# Patient Record
Sex: FEMALE | Race: WHITE | NOT HISPANIC OR LATINO | Employment: OTHER
[De-identification: names, ages, dates, MRNs, and addresses within clinical notes are randomized per-mention and may not be internally consistent; named-entity substitution may affect disease eponyms.]

---

## 2017-09-07 ENCOUNTER — RECORDS - HEALTHEAST (OUTPATIENT)
Dept: ADMINISTRATIVE | Facility: OTHER | Age: 37
End: 2017-09-07

## 2017-09-07 LAB
BKR LAB AP ABNORMAL BLEEDING: NO
BKR LAB AP BIRTH CONTROL/HORMONES: NORMAL
BKR LAB AP CERVICAL APPEARANCE: NORMAL
BKR LAB AP GYN ADEQUACY: NORMAL
BKR LAB AP GYN INTERPRETATION: NORMAL
BKR LAB AP HPV REFLEX: NORMAL
BKR LAB AP LMP: NORMAL
BKR LAB AP PATIENT STATUS: NORMAL
BKR LAB AP PREVIOUS ABNORMAL: NORMAL
BKR LAB AP PREVIOUS NORMAL: 2014
HIGH RISK?: NO
PATH REPORT.COMMENTS IMP SPEC: NORMAL
RESULT FLAG (HE HISTORICAL CONVERSION): NORMAL

## 2017-09-17 ENCOUNTER — HOSPITAL ENCOUNTER (EMERGENCY)
Facility: CLINIC | Age: 37
Discharge: HOME | End: 2017-09-17
Attending: PHYSICIAN ASSISTANT | Admitting: PHYSICIAN ASSISTANT
Payer: COMMERCIAL

## 2017-09-17 VITALS
TEMPERATURE: 98.2 F | DIASTOLIC BLOOD PRESSURE: 80 MMHG | SYSTOLIC BLOOD PRESSURE: 121 MMHG | OXYGEN SATURATION: 99 % | RESPIRATION RATE: 17 BRPM

## 2017-09-17 DIAGNOSIS — R35.0 URINARY FREQUENCY: ICD-10-CM

## 2017-09-17 DIAGNOSIS — R30.0 BURNING WITH URINATION: ICD-10-CM

## 2017-09-17 DIAGNOSIS — R82.90 BAD ODOR OF URINE: ICD-10-CM

## 2017-09-17 LAB
ALBUMIN UR-MCNC: NEGATIVE MG/DL
APPEARANCE UR: CLEAR
BACTERIA #/AREA URNS HPF: ABNORMAL /HPF
BILIRUB UR QL STRIP: NEGATIVE
COLOR UR AUTO: YELLOW
GLUCOSE UR STRIP-MCNC: NEGATIVE MG/DL
HCG UR QL: NEGATIVE
HGB UR QL STRIP: ABNORMAL
KETONES UR STRIP-MCNC: NEGATIVE MG/DL
LEUKOCYTE ESTERASE UR QL STRIP: NEGATIVE
MUCOUS THREADS #/AREA URNS LPF: PRESENT /LPF
NITRATE UR QL: NEGATIVE
PH UR STRIP: 6 PH (ref 5–7)
RBC #/AREA URNS AUTO: 3 /HPF (ref 0–2)
SOURCE: ABNORMAL
SP GR UR STRIP: 1.01 (ref 1–1.03)
SPECIMEN SOURCE: NORMAL
SQUAMOUS #/AREA URNS AUTO: 3 /HPF (ref 0–1)
UROBILINOGEN UR STRIP-MCNC: NORMAL MG/DL (ref 0–2)
WBC #/AREA URNS AUTO: 1 /HPF (ref 0–2)
WET PREP SPEC: NORMAL

## 2017-09-17 PROCEDURE — 99213 OFFICE O/P EST LOW 20 MIN: CPT | Performed by: PHYSICIAN ASSISTANT

## 2017-09-17 PROCEDURE — 81025 URINE PREGNANCY TEST: CPT | Performed by: PHYSICIAN ASSISTANT

## 2017-09-17 PROCEDURE — 87210 SMEAR WET MOUNT SALINE/INK: CPT | Performed by: PHYSICIAN ASSISTANT

## 2017-09-17 PROCEDURE — 87086 URINE CULTURE/COLONY COUNT: CPT | Performed by: PHYSICIAN ASSISTANT

## 2017-09-17 PROCEDURE — 81001 URINALYSIS AUTO W/SCOPE: CPT | Performed by: PHYSICIAN ASSISTANT

## 2017-09-17 PROCEDURE — 99213 OFFICE O/P EST LOW 20 MIN: CPT

## 2017-09-17 RX ORDER — NITROFURANTOIN 25; 75 MG/1; MG/1
100 CAPSULE ORAL 2 TIMES DAILY
Qty: 14 CAPSULE | Refills: 0 | Status: SHIPPED | OUTPATIENT
Start: 2017-09-17

## 2017-09-17 NOTE — ED PROVIDER NOTES
SUBJECTIVE  Mari Mclaughlin is a 37 year old female who has symptoms of urinary dysuria, urgency and frequency for 3 day(s).  She has also noticed a strong odor.  she denies back pain, nausea, vomiting and fever.     OBJECTIVE     Vital signs noted and reviewed by Bharathi Sood  /80  Temp 98.2  F (36.8  C) (Oral)  Resp 17  SpO2 99%     PEFR:  General appearance: healthy, alert and no distress  Ears: R TM - normal: no effusions, no erythema, and normal landmarks, L TM - normal: no effusions, no erythema, and normal landmarks  Eyes: R normal, L normal  Nose: normal  Oropharynx: normal  Neck: supple and no adenopathy  Lungs: normal and clear to auscultation  Heart: S1, S2 normal, no murmur, click, rub or gallop, regular rate and rhythm  Abdomen: positive findings: tenderness mild RLQ and LLQ           Labs:     Results for orders placed or performed during the hospital encounter of 09/17/17   UA reflex to Microscopic   Result Value Ref Range    Color Urine Yellow     Appearance Urine Clear     Glucose Urine Negative NEG^Negative mg/dL    Bilirubin Urine Negative NEG^Negative    Ketones Urine Negative NEG^Negative mg/dL    Specific Gravity Urine 1.012 1.003 - 1.035    Blood Urine Trace (A) NEG^Negative    pH Urine 6.0 5.0 - 7.0 pH    Protein Albumin Urine Negative NEG^Negative mg/dL    Urobilinogen mg/dL Normal 0.0 - 2.0 mg/dL    Nitrite Urine Negative NEG^Negative    Leukocyte Esterase Urine Negative NEG^Negative    Source Midstream Urine     RBC Urine 3 (H) 0 - 2 /HPF    WBC Urine 1 0 - 2 /HPF    Bacteria Urine Few (A) NEG^Negative /HPF    Squamous Epithelial /HPF Urine 3 (H) 0 - 1 /HPF    Mucous Urine Present (A) NEG^Negative /LPF   HCG qualitative urine   Result Value Ref Range    HCG Qual Urine Negative NEG^Negative   Wet prep   Result Value Ref Range    Specimen Description Vagina     Wet Prep No Trichomonas seen     Wet Prep No clue cells seen     Wet Prep No yeast seen     Wet Prep Few  WBC'S  seen          ASSESSMENT     (R35.0) Urinary frequency  Plan: nitroFURantoin, macrocrystal-monohydrate,         (MACROBID) 100 MG capsule      (R30.0) Burning with urination  Plan: nitroFURantoin, macrocrystal-monohydrate,         (MACROBID) 100 MG capsule      (R82.90) Bad odor of urine  Plan: nitroFURantoin, macrocrystal-monohydrate,         (MACROBID) 100 MG capsule         PLAN  Urinalysis discussed with patient  Treatment currentguidelines - also push fluids, may use Pyridium OTC prn. Follow up with PCP if these symptoms worsen or fail to improve as anticipated.    Rx for Macrobid for 7 days.    We will call with culture results if resistant.        Bharathi Sood  9/17/2017, 3:56 PM       Bharathi Sood PA-C  09/17/17 1720

## 2017-09-17 NOTE — DISCHARGE INSTRUCTIONS

## 2017-09-17 NOTE — ED AVS SNAPSHOT
[facility] Emergency Department    [address] [facility] [address] [address]    Phone:  [phone]    Fax:  [phone]                                       Mari Mclaughlin   MRN: [id number]    Department:  [facility] Emergency Department   Date of Visit:  9/17/2017           After Visit Summary Signature Page     I have received my discharge instructions, and my questions have been answered. I have discussed any challenges I see with this plan with the nurse or doctor.    ..........................................................................................................................................  Patient/Patient Representative Signature      ..........................................................................................................................................  Patient Representative Print Name and Relationship to Patient    ..................................................               ................................................  Date                                            Time    ..........................................................................................................................................  Reviewed by Signature/Title    ...................................................              ..............................................  Date                                                            Time

## 2017-09-17 NOTE — ED AVS SNAPSHOT
" [facility] Emergency Department    [address] [facility] [address]    Phone:  [phone]    Fax:  [phone]                                       Mari Mclaughlin   MRN: [id number]    Department:  [facility] Emergency Department   Date of Visit:  9/17/2017           Patient Information     Date Of Birth          1980        Your diagnoses for this visit were:     Urinary frequency     Burning with urination     Bad odor of urine        You were seen by Bharathi Sood PA-C.        Discharge Instructions         Dysuria     Painful urination (dysuria) is often caused by a problem in the urinary tract.   Dysuria is pain felt during urination. It is often described as a burning. Learn more about this problem and how it can be treated.  What causes dysuria?  Possible causes include:    Infection with a bacteria or virus such as a urinary tract infection (UTI or a sexually transmitted infection (STI)    Sensitivity or allergy to chemicals such as those found in lotions and other products    Prostate or bladder problems    Radiation therapy to the pelvic area  How is dysuria diagnosed?  Your healthcare provider will examine you. He or she will ask about your symptoms and health. After talking with you and doing a physical exam, your healthcare provider may know what is causing your dysuria. He or she will usually request  a sample of your urine. Tests of your urine, or a \"urinalysis,\" are done. A urinalysis may include:    Looking at the urine sample (visual exam)    Checking for substances (chemical exam)    Looking at a small amount under a microscope (microscopic exam)  Some parts of the urinalysis may be done in the provider's office and some in a lab. And, the urine sample may be checked for bacteria and yeast (urine culture). Your healthcare provider will tell you more about these tests if they are needed.  How is dysuria treated?  Treatment depends on the cause. If you have a " bacterial infection, you may need antibiotics. You may be given medicines to make it easier for you to urinate and help relieve pain. Your healthcare provider can tell you more about your treatment options. Untreated, symptoms may get worse.  When to call your healthcare provider  Call the healthcare provider right away if you have any of the following:    Fever of 100.4 F (38 C) or higher     No improvement after three days of treatment    Trouble urinating because of pain    New or increased discharge from the vagina or penis    Rash or joint pain    Increased back or abdominal pain    Enlarged painful lymph nodes (lumps) in the groin   Date Last Reviewed: 1/1/2017 2000-2017 [employer]. [address] [place] [address]. All rights reserved. This information is not intended as a substitute for professional medical care. Always follow your healthcare professional's instructions.          24 Hour Appointment Hotline       To make an appointment at any [facility], call [phone] ([phone]). If you don't have a family doctor or clinic, we will help you find one. [facility] are conveniently located to serve the needs of you and your family.             Review of your medicines      START taking        Dose / Directions Last dose taken    nitroFURantoin (macrocrystal-monohydrate) 100 MG capsule   Commonly known as:  MACROBID   Dose:  100 mg   Quantity:  14 capsule        Take 1 capsule (100 mg) by mouth 2 times daily   Refills:  0          Our records show that you are taking the medicines listed below. If these are incorrect, please call your family doctor or clinic.        Dose / Directions Last dose taken    albuterol 108 (90 BASE) MCG/ACT Inhaler   Commonly known as:  PROAIR HFA/PROVENTIL HFA/VENTOLIN HFA   Dose:  2 puff   Quantity:  1 Inhaler        Inhale 2 puffs into the lungs every 6 hours as needed for shortness of breath / dyspnea   Refills:  0        doxycycline  100 MG tablet   Commonly known as:  VIBRA-TABS   Dose:  100 mg   Quantity:  56 tablet        Take 1 tablet (100 mg) by mouth 2 times daily   Refills:  1        EPINEPHrine 0.3 MG/0.3ML injection 2-pack   Commonly known as:  EPIPEN/ADRENACLICK/or ANY BX GENERIC EQUIV   Dose:  0.3 mg   Quantity:  2 each        Inject 0.3 mLs (0.3 mg) into the muscle once as needed   Refills:  3        ibuprofen 800 MG tablet   Commonly known as:  ADVIL/MOTRIN   Dose:  800 mg   Quantity:  90 tablet        Take 1 tablet (800 mg) by mouth every 8 hours as needed for pain   Refills:  1        Multi-vitamin Tabs tablet   Dose:  1 tablet        Take 1 tablet by mouth daily   Refills:  0        Omega-3 Fat Ac-Cholecalciferol 500-1000 MG-UNIT Caps        Refills:  0        PROBIOTIC PO   Dose:  1 capsule        Take 1 capsule by mouth every evening   Refills:  0        ZANTAC PO   Dose:  150 mg        Take 150 mg by mouth 2 times daily   Refills:  0                Prescriptions were sent or printed at these locations (1 Prescription)                   [facility] [address]   [address] [place] [address]    Telephone:  [phone]   Fax:  [phone]   Hours:                  E-Prescribed (1 of 1)         nitroFURantoin, macrocrystal-monohydrate, (MACROBID) 100 MG capsule                Procedures and tests performed during your visit     HCG qualitative urine    UA reflex to Microscopic    Urine Culture    Wet prep      Orders Needing Specimen Collection     None      Pending Results     Date and Time Order Name Status Description    9/17/2017 1556 Urine Culture In process             Pending Culture Results     Date and Time Order Name Status Description    9/17/2017 1556 Urine Culture In process             Pending Results Instructions     If you had any lab results that were not finalized at the time of your Discharge, you can call the ED Lab Result RN at [phone]. You will be contacted  by this team for any positive Lab results or changes in treatment. The nurses are available 7 days a week from 10A to 6:30P.  You can leave a message 24 hours per day and they will return your call.        Test Results From Your Hospital Stay        9/17/2017  4:46 PM      Component Results     Component Value Ref Range & Units Status    Color Urine Yellow  Final    Appearance Urine Clear  Final    Glucose Urine Negative NEG^Negative mg/dL Final    Bilirubin Urine Negative NEG^Negative Final    Ketones Urine Negative NEG^Negative mg/dL Final    Specific Gravity Urine 1.012 1.003 - 1.035 Final    Blood Urine Trace (A) NEG^Negative Final    pH Urine 6.0 5.0 - 7.0 pH Final    Protein Albumin Urine Negative NEG^Negative mg/dL Final    Urobilinogen mg/dL Normal 0.0 - 2.0 mg/dL Final    Nitrite Urine Negative NEG^Negative Final    Leukocyte Esterase Urine Negative NEG^Negative Final    Source Midstream Urine  Final    RBC Urine 3 (H) 0 - 2 /HPF Final    WBC Urine 1 0 - 2 /HPF Final    Bacteria Urine Few (A) NEG^Negative /HPF Final    Squamous Epithelial /HPF Urine 3 (H) 0 - 1 /HPF Final    Mucous Urine Present (A) NEG^Negative /LPF Final         9/17/2017  4:38 PM         9/17/2017  4:50 PM      Component Results     Component Value Ref Range & Units Status    HCG Qual Urine Negative NEG^Negative Final    This test is for screening purposes.  Results should be interpreted along with   the clinical picture.  Confirmation testing is available if warranted by   ordering XLJ418, HCG Quantitative Pregnancy.           9/17/2017  5:12 PM      Component Results     Component    Specimen Description    Vagina    Wet Prep    No Trichomonas seen    Wet Prep    No clue cells seen    Wet Prep    No yeast seen    Wet Prep    Few  WBC'S seen                  Thank you for choosing [facility]       Thank you for choosing [facility] for your care. Our goal is always to provide you with excellent care. Hearing back from our patients is one way  "we can continue to improve our services. Please take a few minutes to complete the written survey that you may receive in the mail after you visit with us. Thank you!        [facility] Information     [facility] lets you send messages to your doctor, view your test results, renew your prescriptions, schedule appointments and more. To sign up, go to [email or web address] . Click on \"Log in\" on the left side of the screen, which will take you to the Welcome page. Then click on \"Sign up Now\" on the right side of the page.     You will be asked to enter the access code listed below, as well as some personal information. Please follow the directions to create your username and password.     Your access code is: [email or web address]  Expires: 2017  5:26 PM     Your access code will  in 90 days. If you need help or a new code, please call your [facility] clinic or [phone].        Care EveryWhere ID     This is your Care EveryWhere ID. This could be used by other organizations to access your [facility] medical records  [id number]        Equal Access to Services     BENNIE RABAGO : Hadissac Quiros, frantz murphy, domo clemens, kim patino. So [facility] [phone].    ATENCIÓN: Si habla español, tiene a fernandez disposición servicios gratuitos de asistencia lingüística. Osmin al [phone].    We comply with applicable federal civil rights laws and [place] laws. We do not discriminate on the basis of race, color, national origin, age, disability sex, sexual orientation or gender identity.            After Visit Summary       This is your record. Keep this with you and show to your community pharmacist(s) and doctor(s) at your next visit.                  "

## 2017-09-18 LAB
BACTERIA SPEC CULT: NORMAL
Lab: NORMAL
SPECIMEN SOURCE: NORMAL

## 2018-05-10 ENCOUNTER — RECORDS - HEALTHEAST (OUTPATIENT)
Dept: LAB | Facility: CLINIC | Age: 38
End: 2018-05-10

## 2018-05-11 LAB — BACTERIA SPEC CULT: NO GROWTH

## 2019-03-01 VITALS
HEART RATE: 86 BPM | RESPIRATION RATE: 20 BRPM | SYSTOLIC BLOOD PRESSURE: 122 MMHG | OXYGEN SATURATION: 100 % | DIASTOLIC BLOOD PRESSURE: 81 MMHG | TEMPERATURE: 98.1 F

## 2019-03-01 PROCEDURE — 99284 EMERGENCY DEPT VISIT MOD MDM: CPT | Mod: Z6 | Performed by: EMERGENCY MEDICINE

## 2019-03-01 PROCEDURE — 81025 URINE PREGNANCY TEST: CPT | Performed by: EMERGENCY MEDICINE

## 2019-03-01 PROCEDURE — 81015 MICROSCOPIC EXAM OF URINE: CPT | Performed by: EMERGENCY MEDICINE

## 2019-03-01 PROCEDURE — 99283 EMERGENCY DEPT VISIT LOW MDM: CPT | Performed by: EMERGENCY MEDICINE

## 2019-03-01 PROCEDURE — 81003 URINALYSIS AUTO W/O SCOPE: CPT | Performed by: EMERGENCY MEDICINE

## 2019-03-02 ENCOUNTER — HOSPITAL ENCOUNTER (EMERGENCY)
Facility: CLINIC | Age: 39
Discharge: HOME | End: 2019-03-02
Attending: EMERGENCY MEDICINE | Admitting: EMERGENCY MEDICINE
Payer: COMMERCIAL

## 2019-03-02 DIAGNOSIS — R30.0 DYSURIA: ICD-10-CM

## 2019-03-02 LAB
ALBUMIN UR-MCNC: NEGATIVE MG/DL
APPEARANCE UR: CLEAR
BACTERIA #/AREA URNS HPF: ABNORMAL /HPF
BILIRUB UR QL STRIP: NEGATIVE
COLOR UR AUTO: ABNORMAL
GLUCOSE UR STRIP-MCNC: NEGATIVE MG/DL
HCG UR QL: NEGATIVE
HGB UR QL STRIP: ABNORMAL
KETONES UR STRIP-MCNC: NEGATIVE MG/DL
LEUKOCYTE ESTERASE UR QL STRIP: NEGATIVE
NITRATE UR QL: NEGATIVE
PH UR STRIP: 6 PH (ref 5–7)
RBC #/AREA URNS AUTO: 1 /HPF (ref 0–2)
SOURCE: ABNORMAL
SP GR UR STRIP: 1 (ref 1–1.03)
SQUAMOUS #/AREA URNS AUTO: <1 /HPF (ref 0–1)
UROBILINOGEN UR STRIP-MCNC: 0 MG/DL (ref 0–2)
WBC #/AREA URNS AUTO: 1 /HPF (ref 0–5)

## 2019-03-02 RX ORDER — PHENAZOPYRIDINE HYDROCHLORIDE 200 MG/1
200 TABLET, FILM COATED ORAL 3 TIMES DAILY
Qty: 9 TABLET | Refills: 0 | Status: SHIPPED | OUTPATIENT
Start: 2019-03-02 | End: 2019-03-05

## 2019-03-02 NOTE — DISCHARGE INSTRUCTIONS
Return if symptoms worsen or new symptoms develop.  Follow-up with primary care early next week for recheck.  Drink plenty fluids.  Take ibuprofen and Tylenol for pain.  If worsening pain fevers or symptoms present please return for further evaluation and care.

## 2019-03-02 NOTE — ED AVS SNAPSHOT
[facility] Emergency Department  [address] [facility] [address]  Phone:  [phone]  Fax:  [phone]                                    Mari Mclaughlin   MRN: [id number]    Department:  [facility] Emergency Department   Date of Visit:  3/1/2019           After Visit Summary Signature Page    I have received my discharge instructions, and my questions have been answered. I have discussed any challenges I see with this plan with the nurse or doctor.    ..........................................................................................................................................  Patient/Patient Representative Signature      ..........................................................................................................................................  Patient Representative Print Name and Relationship to Patient    ..................................................               ................................................  Date                                   Time    ..........................................................................................................................................  Reviewed by Signature/Title    ...................................................              ..............................................  Date                                               Time          22EPIC Rev 08/18

## 2019-03-03 ASSESSMENT — ENCOUNTER SYMPTOMS
ACTIVITY CHANGE: 0
DYSURIA: 1
NUMBNESS: 0
NAUSEA: 0
BACK PAIN: 0
APPETITE CHANGE: 0
NECK PAIN: 0
SHORTNESS OF BREATH: 0
FEVER: 0
DIFFICULTY URINATING: 0
ABDOMINAL PAIN: 0
CHILLS: 0
VOMITING: 0
FREQUENCY: 1
SORE THROAT: 0
BRUISES/BLEEDS EASILY: 0
WEAKNESS: 0

## 2019-03-03 NOTE — ED PROVIDER NOTES
History     Chief Complaint   Patient presents with     Rule out Urinary Tract Infection     HPI  Mari Mclaughlin is a 38 year old female who presents with the emergency department complaining of frequency any burning intermittently for the past month.  Patient is concerned she has a urinary tract infection.  Pain is in her bladder region and occasionally has frequency and burning.  This is intermittently happened and sometimes is bad and other times is not.  She has not noticed any blood in her urine.  She denies any fevers or chills.  She has not had any other significant abdominal pain or back pain.  She has not had any flank pain.  She denies any focal numbness weakness in extremity.  She currently denies any significant pain.    Allergies:  Allergies   Allergen Reactions     Nkda [No Known Drug Allergies]      Macrobid [Nitrofurantoin] Rash       Problem List:    Patient Active Problem List    Diagnosis Date Noted     Atypical chest pain 01/23/2014     Priority: Medium     Hyperlipidemia 08/16/2013     Priority: Medium     Diagnosis updated by automated process. Provider to review and confirm.       CARDIOVASCULAR SCREENING; LDL GOAL LESS THAN 160 10/31/2010     Priority: Medium     OSMANY 3 01/01/2009     Priority: Medium     10/21/08:Pap--LSIL  11/11/08:Colpo--OSMANY 2-3. Rec LEEP  12/8/08:LEEP--OSMANY 3, clear margins. Repeat pap in 6 months and pap and HPV in 12 months.  8/10/09:pap--NIL. Repeat pap 6 months.  9/21/10:Pap--NIL. Repeat pap 1 year.  9/15/11 pap ASCUS, - HPV. Rpt pap in one year. In HM, ep, prob list, hx updated. Reminder sent.  11/6/12:Pap--NIL. Pap in 1 year.  2/14/14:Pap--NIL, -HPV. History of abnormal Pap smear: YES - OSMANY 2/3 on biopsy - PAP/HPV co-testing at 12, 24 months.  If two negative results repeat co-testing in 3 years, if negative then routine screening. Pap + HPV in 1 year.             Back pain 05/13/2008     Priority: Medium     03/06/2008: Seen by [facility]  Assessment: Right renal calculi, 3 mm unchanged in size and location. Low back pain, right-sided, likely musculoskeletal.       TOBACCO USE DISORDER 05/08/2008     Priority: Medium     Generalized anxiety disorder 04/03/2007     Priority: Medium     Failed Prozac - increased anxiety  Celexa - weight gain  Zoloft - head zaps  Paxil - side effects  9/10 trial Lexapro         Calculus of kidney      Priority: Medium     1/07 Cysto, let ureteroscopy, lithotripsy, retrograde pyelogram and stone extraction with double J stent insertion-Dr Skinenr.  08/13/07  3-mm stone in the right kidney-that is being followed.Her stones are calcium oxalate..  03/06/2008: Seen by [facility]  Assessment: Right renal calculi, 3 mm unchanged in size and location. Low back pain, right-sided, likely musculoskeletal.         BREAST DISORDERS NEC - enlargement/assymetry 11/25/2006     Priority: Medium     Problem list name updated by automated process. Provider to review and confirm       Contraceptive management      Priority: Medium     Problem list name updated by automated process. Provider to review       Health Care Home 08/13/2013     Priority: Low     EMERGENCY CARE PLAN  August 13, 2013: No current Care Coordination follow up planned. Please refer if Care Coordination services are needed.    Presenting Problem Signs and Symptoms Treatment Plan   Questions or concerns   during clinic hours   I will call my clinic directly:  [facility]  [id number] [place] [address] [id number] [phone].    Questions or concerns outside clinic hours   I will call the 24 hour nurse line at   [phone] or [facility].   Need to schedule an appointment   I will call the 24 hour scheduling team at [phone] or my clinic directly at [phone].    Same day treatment     I will call my clinic first, nurse line if after hours, urgent care and express care if needed.   Clinic care coordination  services (regular clinic hours)     I will call a clinic care coordinator directly:     Facundo Mina RN  Sergio Perez, Fri - [phone]  Wed, Thurs - [phone]    Tabitha Gregory, :    [phone]    Or call my clinic at [phone] and ask to speak with care coordination.   Crisis Services: Behavioral or Mental Health  Crisis Connection 24 Hour Phone Line  [phone]    [facility] 24 Hour Crisis Services  [phone]    [facility] [phone]    [facility]   [phone]       Emergency treatment -- Immediately    CAll 911             Past Medical History:    Past Medical History:   Diagnosis Date     Chickenpox      OSMANY 3 12/08     Postpartum depression        Past Surgical History:    Past Surgical History:   Procedure Laterality Date     CYSTOSCOPY,URETEROSCOPY,LITHOTRIPSY  01/29/2007    LEFT flexible ureteroscopy, LEFT laser lithotripsy     HC UROGRAPHY, RETROGRADE W/WO KUB  01/29/2007    LEFT     HERNIA REPAIR      Right     LAPAROSCOPIC TUBAL LIGATION  10/25/2011    Procedure:LAPAROSCOPIC TUBAL LIGATION; Bilateral Laparoscopic Tubal Ligation with filshie clips; Surgeon:SHANE HENDERSON; Location:[place] OR     LEEP TX, CERVICAL  12/8/08    OSMANY 3 *yearly pap indicated*     Stone extraction and double J insertion performed at [facility] by Raffaele Skinner MD  01/29/2007       Family History:    Family History   Problem Relation Age of Onset     C.A.D. Father         CAB 3v, in 40's     Lipids Father      Lipids Mother      Gastrointestinal Disease Mother 59        colitis     Lipids Sister        Social History:  Marital Status:  Single [1]  Social History     Tobacco Use     Smoking status: Current Every Day Smoker     Packs/day: 0.50     Years: 10.00     Pack years: 5.00     Types: Cigarettes     Smokeless tobacco: Never Used   Substance Use Topics     Alcohol use: No     Drug use: No        Medications:      phenazopyridine  (PYRIDIUM) 200 MG tablet   albuterol (PROAIR HFA, PROVENTIL HFA, VENTOLIN HFA) 108 (90 BASE) MCG/ACT inhaler   doxycycline (VIBRA-TABS) 100 MG tablet   EPINEPHrine (EPIPEN) 0.3 MG/0.3ML injection   ibuprofen (ADVIL,MOTRIN) 800 MG tablet   multivitamin, therapeutic with minerals (MULTI-VITAMIN) TABS   nitroFURantoin, macrocrystal-monohydrate, (MACROBID) 100 MG capsule   Omega-3 Fat Ac-Cholecalciferol 500-1000 MG-UNIT CAPS   Probiotic Product (PROBIOTIC PO)   Ranitidine HCl (ZANTAC PO)         Review of Systems   Constitutional: Negative for activity change, appetite change, chills and fever.   HENT: Negative for sore throat.    Respiratory: Negative for shortness of breath.    Cardiovascular: Negative for chest pain.   Gastrointestinal: Negative for abdominal pain, nausea and vomiting.   Genitourinary: Positive for dysuria and frequency. Negative for decreased urine volume, difficulty urinating, vaginal bleeding and vaginal discharge.   Musculoskeletal: Negative for back pain and neck pain.   Skin: Negative for rash.   Neurological: Negative for weakness and numbness.   Hematological: Does not bruise/bleed easily.       Physical Exam   BP: 122/81  Pulse: 86  Temp: 98.1  F (36.7  C)  Resp: 20  SpO2: 100 %      Physical Exam   Constitutional: She appears well-developed and well-nourished. No distress.   HENT:   Head: Normocephalic.   Mouth/Throat: Oropharynx is clear and moist.   Eyes: Conjunctivae are normal.   Neck: Normal range of motion.   Pulmonary/Chest: Effort normal.   Abdominal: Soft. She exhibits no distension. There is no tenderness.   Musculoskeletal: Normal range of motion. She exhibits no edema or tenderness.   Neurological: She is alert. She exhibits normal muscle tone.   Skin: Skin is warm. Capillary refill takes less than 2 seconds. No rash noted. No erythema.   Psychiatric: She has a normal mood and affect.   Nursing note and vitals reviewed.      ED Course        Procedures               Critical  Care time:  none                   Labs Ordered and Resulted from Time of ED Arrival Up to the Time of Departure from the ED   URINE MACROSCOPIC WITH REFLEX TO MICRO - Abnormal; Notable for the following components:       Result Value    Specific Gravity Urine 1.002 (*)     Blood Urine Small (*)     Bacteria Urine Few (*)     All other components within normal limits   HCG QUALITATIVE URINE       Assessments & Plan (with Medical Decision Making) UA was obtained and revealed no evidence of obvious infection at this time.  Pregnancy test was negative.  Patient is afebrile and having minimal symptoms at this time.  I discussed lab work and further assessment but at this time I do not feel warranted.  I am going to give the patient some Pyridium and see if this improves her symptoms.  She should have her urine rechecked if symptoms worsen or new symptoms develop.  She has no flank pain at this time and no significant abdominal pain.  She should return if any fevers worsening urinary symptoms or other symptoms present.     I have reviewed the nursing notes.    I have reviewed the findings, diagnosis, plan and need for follow up with the patient.          Medication List      Started    phenazopyridine 200 MG tablet  Commonly known as:  PYRIDIUM  200 mg, Oral, 3 TIMES DAILY            Final diagnoses:   Dysuria       3/1/2019   [facility] EMERGENCY DEPARTMENT     Kwame Urias MD  03/03/19 6784

## 2019-04-04 ENCOUNTER — RECORDS - HEALTHEAST (OUTPATIENT)
Dept: LAB | Facility: CLINIC | Age: 39
End: 2019-04-04

## 2019-04-05 LAB
CHOLEST SERPL-MCNC: 211 MG/DL
FASTING STATUS PATIENT QL REPORTED: ABNORMAL
HDLC SERPL-MCNC: 54 MG/DL
HPV SOURCE: NORMAL
HUMAN PAPILLOMA VIRUS 16 DNA: NEGATIVE
HUMAN PAPILLOMA VIRUS 18 DNA: NEGATIVE
HUMAN PAPILLOMA VIRUS FINAL DIAGNOSIS: NORMAL
HUMAN PAPILLOMA VIRUS OTHER HR: NEGATIVE
LDLC SERPL CALC-MCNC: 130 MG/DL
SPECIMEN DESCRIPTION: NORMAL
TRIGL SERPL-MCNC: 136 MG/DL

## 2019-04-11 LAB

## 2020-12-27 ENCOUNTER — NURSE TRIAGE (OUTPATIENT)
Dept: NURSING | Facility: CLINIC | Age: 40
End: 2020-12-27

## 2021-02-15 ENCOUNTER — RECORDS - HEALTHEAST (OUTPATIENT)
Dept: LAB | Facility: CLINIC | Age: 41
End: 2021-02-15

## 2021-02-15 LAB
FSH SERPL-ACNC: 5.8 MIU/ML
PROLACTIN SERPL-MCNC: 4.2 NG/ML (ref 0–20)
TSH SERPL DL<=0.005 MIU/L-ACNC: 1.12 UIU/ML (ref 0.3–5)

## 2021-02-16 LAB — BACTERIA SPEC CULT: NO GROWTH

## 2021-05-30 ENCOUNTER — RECORDS - HEALTHEAST (OUTPATIENT)
Dept: ADMINISTRATIVE | Facility: CLINIC | Age: 41
End: 2021-05-30

## 2021-07-02 ENCOUNTER — RECORDS - HEALTHEAST (OUTPATIENT)
Dept: LAB | Facility: CLINIC | Age: 41
End: 2021-07-02

## 2021-07-02 LAB
ALBUMIN SERPL-MCNC: 4.1 G/DL (ref 3.5–5)
ALP SERPL-CCNC: 48 U/L (ref 45–120)
ALT SERPL W P-5'-P-CCNC: 16 U/L (ref 0–45)
ANION GAP SERPL CALCULATED.3IONS-SCNC: 13 MMOL/L (ref 5–18)
AST SERPL W P-5'-P-CCNC: 14 U/L (ref 0–40)
BILIRUB SERPL-MCNC: 0.5 MG/DL (ref 0–1)
BUN SERPL-MCNC: 12 MG/DL (ref 8–22)
CALCIUM SERPL-MCNC: 9.5 MG/DL (ref 8.5–10.5)
CHLORIDE BLD-SCNC: 108 MMOL/L (ref 98–107)
CHOLEST SERPL-MCNC: 213 MG/DL
CO2 SERPL-SCNC: 19 MMOL/L (ref 22–31)
CREAT SERPL-MCNC: 0.7 MG/DL (ref 0.6–1.1)
ERYTHROCYTE [DISTWIDTH] IN BLOOD BY AUTOMATED COUNT: 12.7 % (ref 11–14.5)
FASTING STATUS PATIENT QL REPORTED: YES
GFR SERPL CREATININE-BSD FRML MDRD: >60 ML/MIN/1.73M2
GLUCOSE BLD-MCNC: 84 MG/DL (ref 70–125)
HCT VFR BLD AUTO: 44.8 % (ref 35–47)
HDLC SERPL-MCNC: 51 MG/DL
HGB BLD-MCNC: 15.2 G/DL (ref 12–16)
LDLC SERPL CALC-MCNC: 140 MG/DL
MCH RBC QN AUTO: 31.9 PG (ref 27–34)
MCHC RBC AUTO-ENTMCNC: 33.9 G/DL (ref 32–36)
MCV RBC AUTO: 94 FL (ref 80–100)
PLATELET # BLD AUTO: 211 THOU/UL (ref 140–440)
PMV BLD AUTO: 11.2 FL (ref 8.5–12.5)
POTASSIUM BLD-SCNC: 3.9 MMOL/L (ref 3.5–5)
PROT SERPL-MCNC: 6.6 G/DL (ref 6–8)
RBC # BLD AUTO: 4.77 MILL/UL (ref 3.8–5.4)
SODIUM SERPL-SCNC: 140 MMOL/L (ref 136–145)
TRIGL SERPL-MCNC: 111 MG/DL
TSH SERPL DL<=0.005 MIU/L-ACNC: 0.9 UIU/ML (ref 0.3–5)
VIT B12 SERPL-MCNC: >2000 PG/ML (ref 213–816)
WBC: 7.6 THOU/UL (ref 4–11)

## 2021-07-04 LAB — 25(OH)D3 SERPL-MCNC: 46.9 NG/ML (ref 30–80)

## 2021-07-06 LAB
HPV SOURCE: NORMAL
HUMAN PAPILLOMA VIRUS 16 DNA: NEGATIVE
HUMAN PAPILLOMA VIRUS 18 DNA: NEGATIVE
HUMAN PAPILLOMA VIRUS FINAL DIAGNOSIS: NORMAL
HUMAN PAPILLOMA VIRUS OTHER HR: NEGATIVE
SPECIMEN DESCRIPTION: NORMAL

## 2021-07-08 LAB
BKR LAB AP ABNORMAL BLEEDING: NO
BKR LAB AP BIRTH CONTROL/HORMONES: NORMAL
BKR LAB AP CERVICAL APPEARANCE: NORMAL
BKR LAB AP GYN ADEQUACY: NORMAL
BKR LAB AP GYN INTERPRETATION: NORMAL
BKR LAB AP HPV REFLEX: NORMAL
BKR LAB AP LMP: NORMAL
BKR LAB AP PATIENT STATUS: NO
BKR LAB AP PREVIOUS ABNORMAL: NORMAL
BKR LAB AP PREVIOUS NORMAL: 2019
HIGH RISK?: NO
PATH REPORT.COMMENTS IMP SPEC: NORMAL
RESULT FLAG (HE HISTORICAL CONVERSION): NORMAL

## 2023-04-28 ENCOUNTER — HOSPITAL ENCOUNTER (EMERGENCY)
Facility: CLINIC | Age: 43
Discharge: HOME | End: 2023-04-28
Attending: EMERGENCY MEDICINE | Admitting: EMERGENCY MEDICINE
Payer: COMMERCIAL

## 2023-04-28 VITALS
DIASTOLIC BLOOD PRESSURE: 73 MMHG | SYSTOLIC BLOOD PRESSURE: 149 MMHG | RESPIRATION RATE: 16 BRPM | WEIGHT: 160 LBS | HEART RATE: 83 BPM | TEMPERATURE: 98.4 F | OXYGEN SATURATION: 99 % | BODY MASS INDEX: 30.21 KG/M2 | HEIGHT: 61 IN

## 2023-04-28 DIAGNOSIS — R35.0 URINARY FREQUENCY: ICD-10-CM

## 2023-04-28 LAB
ALBUMIN UR-MCNC: NEGATIVE MG/DL
APPEARANCE UR: CLEAR
BACTERIA #/AREA URNS HPF: ABNORMAL /HPF
BILIRUB UR QL STRIP: NEGATIVE
COLOR UR AUTO: ABNORMAL
GLUCOSE UR STRIP-MCNC: NEGATIVE MG/DL
HCG UR QL: NEGATIVE
HGB UR QL STRIP: ABNORMAL
KETONES UR STRIP-MCNC: NEGATIVE MG/DL
LEUKOCYTE ESTERASE UR QL STRIP: NEGATIVE
NITRATE UR QL: NEGATIVE
PH UR STRIP: 6 [PH] (ref 5–7)
RBC URINE: 1 /HPF
SP GR UR STRIP: 1 (ref 1–1.03)
SQUAMOUS EPITHELIAL: 1 /HPF
UROBILINOGEN UR STRIP-MCNC: NORMAL MG/DL
WBC URINE: <1 /HPF

## 2023-04-28 PROCEDURE — 99284 EMERGENCY DEPT VISIT MOD MDM: CPT | Performed by: EMERGENCY MEDICINE

## 2023-04-28 PROCEDURE — 250N000013 HC RX MED GY IP 250 OP 250 PS 637: Performed by: EMERGENCY MEDICINE

## 2023-04-28 PROCEDURE — 81025 URINE PREGNANCY TEST: CPT | Performed by: EMERGENCY MEDICINE

## 2023-04-28 PROCEDURE — 81001 URINALYSIS AUTO W/SCOPE: CPT | Performed by: EMERGENCY MEDICINE

## 2023-04-28 RX ORDER — CEPHALEXIN 500 MG/1
500 CAPSULE ORAL ONCE
Status: COMPLETED | OUTPATIENT
Start: 2023-04-28 | End: 2023-04-28

## 2023-04-28 RX ORDER — CEPHALEXIN 500 MG/1
500 CAPSULE ORAL 2 TIMES DAILY
Qty: 8 CAPSULE | Refills: 0 | Status: SHIPPED | OUTPATIENT
Start: 2023-04-28 | End: 2023-05-02

## 2023-04-28 RX ORDER — FLUCONAZOLE 100 MG/1
200 TABLET ORAL ONCE
Status: COMPLETED | OUTPATIENT
Start: 2023-04-28 | End: 2023-04-28

## 2023-04-28 RX ORDER — PHENAZOPYRIDINE HYDROCHLORIDE 100 MG/1
100 TABLET, FILM COATED ORAL ONCE
Status: COMPLETED | OUTPATIENT
Start: 2023-04-28 | End: 2023-04-28

## 2023-04-28 RX ORDER — FLUCONAZOLE 200 MG/1
200 TABLET ORAL DAILY
Qty: 1 TABLET | Refills: 0 | Status: SHIPPED | OUTPATIENT
Start: 2023-04-28

## 2023-04-28 RX ADMIN — CEPHALEXIN 500 MG: 500 CAPSULE ORAL at 23:34

## 2023-04-28 RX ADMIN — PHENAZOPYRIDINE 100 MG: 100 TABLET ORAL at 23:19

## 2023-04-28 RX ADMIN — FLUCONAZOLE 200 MG: 100 TABLET ORAL at 23:34

## 2023-04-28 ASSESSMENT — ENCOUNTER SYMPTOMS
HEADACHES: 0
FEVER: 0
NAUSEA: 0
DYSURIA: 0
VOMITING: 0
FREQUENCY: 1

## 2023-04-28 ASSESSMENT — ACTIVITIES OF DAILY LIVING (ADL): ADLS_ACUITY_SCORE: 35

## 2023-04-29 NOTE — DISCHARGE INSTRUCTIONS
You were seen today for urinary symptoms. The urinalysis is not an obvious UTI, but there is enough there that I'm going to treat you based on your symptoms. I also prescribed a anti-yeast medication to take after you finish the antibiotics to prevent a yeast infection.    Please follow up with your doctor on Monday.    If you develop a fever, come back.     Take good care and I hope you feel better soon.

## 2023-04-29 NOTE — ED PROVIDER NOTES
History     Chief Complaint   Patient presents with     Rule out Urinary Tract Infection     HPI  Mari Mclaughlin is a 42 year old female who who presents with concern for urinary tract infection.  The patient has had this in the past.  She says this feels similar.  She has no dysuria but has frequency and urgency.  No back pain.  No fevers.  No vomiting.  No nausea.  No rashes or recent travel. No headache.    Allergies:  Allergies   Allergen Reactions     Nkda [No Known Drug Allergy]      Macrobid [Nitrofurantoin] Rash       Problem List:    Patient Active Problem List    Diagnosis Date Noted     Atypical chest pain 01/23/2014     Priority: Medium     Hyperlipidemia 08/16/2013     Priority: Medium     Diagnosis updated by automated process. Provider to review and confirm.       CARDIOVASCULAR SCREENING; LDL GOAL LESS THAN 160 10/31/2010     Priority: Medium     OSMANY 3 01/01/2009     Priority: Medium     10/21/08:Pap--LSIL  11/11/08:Colpo--OSMANY 2-3. Rec LEEP  12/8/08:LEEP--OSMANY 3, clear margins. Repeat pap in 6 months and pap and HPV in 12 months.  8/10/09:pap--NIL. Repeat pap 6 months.  9/21/10:Pap--NIL. Repeat pap 1 year.  9/15/11 pap ASCUS, - HPV. Rpt pap in one year. In HM, ep, prob list, hx updated. Reminder sent.  11/6/12:Pap--NIL. Pap in 1 year.  2/14/14:Pap--NIL, -HPV. History of abnormal Pap smear: YES - OSMANY 2/3 on biopsy - PAP/HPV co-testing at 12, 24 months.  If two negative results repeat co-testing in 3 years, if negative then routine screening. Pap + HPV in 1 year.             Back pain 05/13/2008     Priority: Medium     03/06/2008: Seen by [facility]  Assessment: Right renal calculi, 3 mm unchanged in size and location. Low back pain, right-sided, likely musculoskeletal.       TOBACCO USE DISORDER 05/08/2008     Priority: Medium     Generalized anxiety disorder 04/03/2007     Priority: Medium     Failed Prozac - increased anxiety  Celexa - weight gain  Zoloft - head  zaps  Paxil - side effects  9/10 trial Lexapro         Calculus of kidney      Priority: Medium     1/07 Cysto, let ureteroscopy, lithotripsy, retrograde pyelogram and stone extraction with double J stent insertion-Dr Skinner.  08/13/07  3-mm stone in the right kidney-that is being followed.Her stones are calcium oxalate..  03/06/2008: Seen by Talib [facility]  Assessment: Right renal calculi, 3 mm unchanged in size and location. Low back pain, right-sided, likely musculoskeletal.         BREAST DISORDERS NEC - enlargement/assymetry 11/25/2006     Priority: Medium     Problem list name updated by automated process. Provider to review and confirm       Contraceptive management      Priority: Medium     Problem list name updated by automated process. Provider to review       Health Care Home 08/13/2013     Priority: Low     EMERGENCY CARE PLAN  August 13, 2013: No current Care Coordination follow up planned. Please refer if Care Coordination services are needed.    Presenting Problem Signs and Symptoms Treatment Plan   Questions or concerns   during clinic hours   I will call my clinic directly:  [address] [address]  [phone].    Questions or concerns outside clinic hours   I will call the 24 hour nurse line at   [phone] or [facility].   Need to schedule an appointment   I will call the 24 hour scheduling team at [phone] or my clinic directly at [phone].    Same day treatment     I will call my clinic first, nurse line if after hours, urgent care and express care if needed.   Clinic care coordination services (regular clinic hours)     I will call a clinic care coordinator directly:     Facundo Mina RN  Mon, Tues, Fri - [phone]  Wed, Thurs - [phone]    Tabitha Gregory :    [phone]    Or call my clinic at [phone] and ask to speak with care coordination.   Crisis Services: Behavioral or Mental Health  Crisis  Connection 24 Hour Phone Line  [phone]    [facility] 24 Hour Crisis Services  [phone]    [facility] ([facility]) Network [phone]    [facility]   [phone]       Emergency treatment -- Immediately    CAll 911             Past Medical History:    Past Medical History:   Diagnosis Date     Chickenpox      OSMANY 3 12/08     Postpartum depression        Past Surgical History:    Past Surgical History:   Procedure Laterality Date     CYSTOSCOPY,URETEROSCOPY,LITHOTRIPSY  01/29/2007    LEFT flexible ureteroscopy, LEFT laser lithotripsy     HC UROGRAPHY, RETROGRADE W/WO KUB  01/29/2007    LEFT     HERNIA REPAIR      Right     LAPAROSCOPIC TUBAL LIGATION  10/25/2011    Procedure:LAPAROSCOPIC TUBAL LIGATION; Bilateral Laparoscopic Tubal Ligation with filshie clips; Surgeon:SHANE HENDERSON; Location:[place] OR     LEEP TX, CERVICAL  12/8/08    OSMANY 3 *yearly pap indicated*     Stone extraction and double J insertion performed at [facility] by Raffaele Skinner MD  01/29/2007       Family History:    Family History   Problem Relation Age of Onset     C.A.D. Father         CAB 3v, in 40's     Lipids Father      Lipids Mother      Gastrointestinal Disease Mother 59        colitis     Lipids Sister        Social History:  Marital Status:  Single [1]  Social History     Tobacco Use     Smoking status: Every Day     Packs/day: 0.50     Years: 10.00     Pack years: 5.00     Types: Cigarettes     Smokeless tobacco: Never   Substance Use Topics     Alcohol use: No     Drug use: No        Medications:    cephALEXin (KEFLEX) 500 MG capsule  fluconazole (DIFLUCAN) 200 MG tablet  albuterol (PROAIR HFA, PROVENTIL HFA, VENTOLIN HFA) 108 (90 BASE) MCG/ACT inhaler  doxycycline (VIBRA-TABS) 100 MG tablet  EPINEPHrine (EPIPEN) 0.3 MG/0.3ML injection  ibuprofen (ADVIL,MOTRIN) 800 MG tablet  multivitamin, therapeutic with minerals (MULTI-VITAMIN) TABS  nitroFURantoin,  "macrocrystal-monohydrate, (MACROBID) 100 MG capsule  Omega-3 Fat Ac-Cholecalciferol 500-1000 MG-UNIT CAPS  Probiotic Product (PROBIOTIC PO)  Ranitidine HCl (ZANTAC PO)          Review of Systems   Constitutional: Negative for fever.   Gastrointestinal: Negative for nausea and vomiting.   Genitourinary: Positive for frequency and urgency. Negative for dysuria.   Skin: Negative for rash.   Neurological: Negative for headaches.   All other systems reviewed and are negative.    No rashes or recent travel.  Physical Exam   BP: (!) 149/73  Pulse: 83  Temp: 98.4  F (36.9  C)  Resp: 16  Height: 154.9 cm (5' 1\")  Weight: 72.6 kg (160 lb)  SpO2: 99 %      Physical Exam  Constitutional:       General: She is not in acute distress.     Appearance: She is not toxic-appearing.      Comments: Nontoxic   HENT:      Head: Normocephalic.      Right Ear: External ear normal.      Left Ear: External ear normal.      Nose: No congestion.      Mouth/Throat:      Mouth: Mucous membranes are moist.      Pharynx: No oropharyngeal exudate.   Eyes:      General:         Right eye: No discharge.         Left eye: No discharge.      Extraocular Movements: Extraocular movements intact.   Cardiovascular:      Rate and Rhythm: Normal rate.      Pulses: Normal pulses.   Pulmonary:      Effort: No respiratory distress.   Abdominal:      General: There is no distension.      Tenderness: There is no right CVA tenderness or left CVA tenderness.      Comments: mild suprapubic tenderness.   Musculoskeletal:         General: No swelling or deformity.      Cervical back: No rigidity.   Skin:     Capillary Refill: Capillary refill takes less than 2 seconds.      Coloration: Skin is not jaundiced.      Findings: No bruising.   Neurological:      General: No focal deficit present.      Mental Status: She is alert and oriented to person, place, and time.      Cranial Nerves: No cranial nerve deficit.   Psychiatric:      Comments: Very nice         ED Course "              ED Course as of 04/29/23 0353   Fri Apr 28, 2023 2305 There is some bacteria and some blood in the patient's urine.  There is no leukocyte esterase or nitrites.   2332 patient is feeling well.  I updated her about her results.  She then mentioned that she was previously having similar symptoms and it turned out to be a yeast infection.  There is no yeast in her urine here. However, given symptoms and history, will give diflucan here. Will give keflex rx for 4 days. Will give diflucan to take after finishing keflex. Patient is non-toxic. All questions answered. Feels safe with discharge. Gave ER precautions. Will discharge.      Procedures                Results for orders placed or performed during the hospital encounter of 04/28/23 (from the past 24 hour(s))   UA with Microscopic reflex to Culture    Specimen: Urine, Clean Catch   Result Value Ref Range    Color Urine Straw Colorless, Straw, Light Yellow, Yellow    Appearance Urine Clear Clear    Glucose Urine Negative Negative mg/dL    Bilirubin Urine Negative Negative    Ketones Urine Negative Negative mg/dL    Specific Gravity Urine 1.003 1.003 - 1.035    Blood Urine Moderate (A) Negative    pH Urine 6.0 5.0 - 7.0    Protein Albumin Urine Negative Negative mg/dL    Urobilinogen Urine Normal Normal, 2.0 mg/dL    Nitrite Urine Negative Negative    Leukocyte Esterase Urine Negative Negative    Bacteria Urine Few (A) None Seen /HPF    RBC Urine 1 <=2 /HPF    WBC Urine <1 <=5 /HPF    Squamous Epithelials Urine 1 <=1 /HPF    Narrative    Urine Culture not indicated   HCG qualitative urine (UPT)   Result Value Ref Range    hCG Urine Qualitative Negative Negative       Medications   phenazopyridine (PYRIDIUM) tablet 100 mg (100 mg Oral $Given 4/28/23 2319)   fluconazole (DIFLUCAN) tablet 200 mg (200 mg Oral $Given 4/28/23 2334)   cephALEXin (KEFLEX) capsule 500 mg (500 mg Oral $Given 4/28/23 2334)       Assessments & Plan (with Medical Decision Making)      Query UTI, no e/o pyelo, non-toxic, reassuring exam. Doubt kidney stone given exam and history.     I have reviewed the nursing notes.    I have reviewed the findings, diagnosis, plan and need for follow up with the patient.          Medical Decision Making  The patient's presentation was of moderate complexity (an acute illness with systemic symptoms).    The patient's evaluation involved:  ordering and/or review of 1 test(s) in this encounter (see separate area of note for details)    The patient's management necessitated moderate risk (prescription drug management including medications given in the ED).        Discharge Medication List as of 4/28/2023 11:35 PM      START taking these medications    Details   cephALEXin (KEFLEX) 500 MG capsule Take 1 capsule (500 mg) by mouth 2 times daily for 4 days, Disp-8 capsule, R-0, E-Prescribe      fluconazole (DIFLUCAN) 200 MG tablet Take 1 tablet (200 mg) by mouth daily Take 1 tablet after finishing antibiotics, Disp-1 tablet, R-0, E-Prescribe             Final diagnoses:   Urinary frequency       4/28/2023   [facility] EMERGENCY DEPT     Dariusz Bassett MD  04/29/23 0353

## 2023-04-29 NOTE — ED TRIAGE NOTES
Patient reports uti symptoms for 4 days.     Triage Assessment     Row Name 04/28/23 4705       Triage Assessment (Adult)    Airway WDL WDL       Respiratory WDL    Respiratory WDL WDL       Skin Circulation/Temperature WDL    Skin Circulation/Temperature WDL WDL       Cardiac WDL    Cardiac WDL WDL       Peripheral/Neurovascular WDL    Peripheral Neurovascular WDL WDL       Cognitive/Neuro/Behavioral WDL    Cognitive/Neuro/Behavioral WDL WDL

## 2023-05-24 ENCOUNTER — LAB REQUISITION (OUTPATIENT)
Dept: LAB | Facility: CLINIC | Age: 43
End: 2023-05-24

## 2023-05-24 DIAGNOSIS — E78.2 MIXED HYPERLIPIDEMIA: ICD-10-CM

## 2023-05-24 DIAGNOSIS — L65.9 NONSCARRING HAIR LOSS, UNSPECIFIED: ICD-10-CM

## 2023-05-24 DIAGNOSIS — R53.83 OTHER FATIGUE: ICD-10-CM

## 2023-05-24 LAB
ERYTHROCYTE [DISTWIDTH] IN BLOOD BY AUTOMATED COUNT: 12.4 % (ref 10–15)
HCT VFR BLD AUTO: 46 % (ref 35–47)
HGB BLD-MCNC: 15 G/DL (ref 11.7–15.7)
MCH RBC QN AUTO: 31.4 PG (ref 26.5–33)
MCHC RBC AUTO-ENTMCNC: 32.6 G/DL (ref 31.5–36.5)
MCV RBC AUTO: 96 FL (ref 78–100)
PLATELET # BLD AUTO: 218 10E3/UL (ref 150–450)
RBC # BLD AUTO: 4.77 10E6/UL (ref 3.8–5.2)
WBC # BLD AUTO: 7.4 10E3/UL (ref 4–11)

## 2023-05-24 PROCEDURE — 84443 ASSAY THYROID STIM HORMONE: CPT | Performed by: FAMILY MEDICINE

## 2023-05-24 PROCEDURE — 85027 COMPLETE CBC AUTOMATED: CPT | Performed by: FAMILY MEDICINE

## 2023-05-24 PROCEDURE — 80061 LIPID PANEL: CPT | Performed by: FAMILY MEDICINE

## 2023-05-24 PROCEDURE — 82746 ASSAY OF FOLIC ACID SERUM: CPT | Performed by: FAMILY MEDICINE

## 2023-05-24 PROCEDURE — 83540 ASSAY OF IRON: CPT | Performed by: FAMILY MEDICINE

## 2023-05-24 PROCEDURE — 80053 COMPREHEN METABOLIC PANEL: CPT | Performed by: FAMILY MEDICINE

## 2023-05-24 PROCEDURE — 84439 ASSAY OF FREE THYROXINE: CPT | Performed by: FAMILY MEDICINE

## 2023-05-24 PROCEDURE — 82607 VITAMIN B-12: CPT | Performed by: FAMILY MEDICINE

## 2023-05-25 LAB
ALBUMIN SERPL BCG-MCNC: 4.7 G/DL (ref 3.5–5.2)
ALP SERPL-CCNC: 44 U/L (ref 35–104)
ALT SERPL W P-5'-P-CCNC: 18 U/L (ref 10–35)
ANION GAP SERPL CALCULATED.3IONS-SCNC: 10 MMOL/L (ref 7–15)
AST SERPL W P-5'-P-CCNC: 15 U/L (ref 10–35)
BILIRUB SERPL-MCNC: 0.3 MG/DL
BUN SERPL-MCNC: 8.2 MG/DL (ref 6–20)
CALCIUM SERPL-MCNC: 9.5 MG/DL (ref 8.6–10)
CHLORIDE SERPL-SCNC: 105 MMOL/L (ref 98–107)
CHOLEST SERPL-MCNC: 210 MG/DL
CREAT SERPL-MCNC: 0.78 MG/DL (ref 0.51–0.95)
DEPRECATED HCO3 PLAS-SCNC: 25 MMOL/L (ref 22–29)
FOLATE SERPL-MCNC: 29.5 NG/ML (ref 4.6–34.8)
GFR SERPL CREATININE-BSD FRML MDRD: >90 ML/MIN/1.73M2
GLUCOSE SERPL-MCNC: 95 MG/DL (ref 70–99)
HDLC SERPL-MCNC: 52 MG/DL
IRON SERPL-MCNC: 106 UG/DL (ref 37–145)
LDLC SERPL CALC-MCNC: 133 MG/DL
NONHDLC SERPL-MCNC: 158 MG/DL
POTASSIUM SERPL-SCNC: 4 MMOL/L (ref 3.4–5.3)
PROT SERPL-MCNC: 7 G/DL (ref 6.4–8.3)
SODIUM SERPL-SCNC: 140 MMOL/L (ref 136–145)
T4 FREE SERPL-MCNC: 1.29 NG/DL (ref 0.9–1.7)
TRIGL SERPL-MCNC: 123 MG/DL
TSH SERPL DL<=0.005 MIU/L-ACNC: 1.31 UIU/ML (ref 0.3–4.2)
VIT B12 SERPL-MCNC: 2269 PG/ML (ref 232–1245)

## 2024-05-02 ENCOUNTER — HOSPITAL ENCOUNTER (EMERGENCY)
Facility: CLINIC | Age: 44
Discharge: HOME | End: 2024-05-02
Attending: NURSE PRACTITIONER | Admitting: NURSE PRACTITIONER
Payer: COMMERCIAL

## 2024-05-02 VITALS
TEMPERATURE: 98.2 F | RESPIRATION RATE: 24 BRPM | OXYGEN SATURATION: 100 % | HEART RATE: 83 BPM | SYSTOLIC BLOOD PRESSURE: 156 MMHG | DIASTOLIC BLOOD PRESSURE: 87 MMHG
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
## 2024-05-02 PROCEDURE — 250N000011 HC RX IP 250 OP 636: Performed by: NURSE PRACTITIONER

## 2024-05-02 PROCEDURE — 90715 TDAP VACCINE 7 YRS/> IM: CPT | Performed by: NURSE PRACTITIONER

## 2024-05-02 PROCEDURE — G0463 HOSPITAL OUTPT CLINIC VISIT: HCPCS | Mod: 25 | Performed by: NURSE PRACTITIONER

## 2024-05-02 PROCEDURE — 90471 IMMUNIZATION ADMIN: CPT | Performed by: NURSE PRACTITIONER

## 2024-05-02 PROCEDURE — 99213 OFFICE O/P EST LOW 20 MIN: CPT | Performed by: NURSE PRACTITIONER

## 2024-05-02 RX ADMIN — CLOSTRIDIUM TETANI TOXOID ANTIGEN (FORMALDEHYDE INACTIVATED), CORYNEBACTERIUM DIPHTHERIAE TOXOID ANTIGEN (FORMALDEHYDE INACTIVATED), BORDETELLA PERTUSSIS TOXOID ANTIGEN (GLUTARALDEHYDE INACTIVATED), BORDETELLA PERTUSSIS FILAMENTOUS HEMAGGLUTININ ANTIGEN (FORMALDEHYDE INACTIVATED), BORDETELLA PERTUSSIS PERTACTIN ANTIGEN, AND BORDETELLA PERTUSSIS FIMBRIAE 2/3 ANTIGEN 0.5 ML: 5; 2; 2.5; 5; 3; 5 INJECTION, SUSPENSION INTRAMUSCULAR at 15:16

## 2024-05-02 NOTE — DISCHARGE INSTRUCTIONS
Recommend taking Augmentin twice daily for 7 days for the dog bites, if you develop symptoms of infection including fever, increased redness, increased pain, pus colored drainage these are symptoms of infection you should be evaluated in your primary care clinic or return to the urgent care or ER for further evaluation.

## 2024-05-14 NOTE — ED PROVIDER NOTES
ED Provider Note  [facility]      History     Chief Complaint   Patient presents with    Immunization     Pateint states she was bitten by a puppy at a shelter and her tetanus is overdue, her PCP instructed her to come to  for a tetanus vaccine      HPI  Mari Mclaughlin is a 43 year old female who presents with bite to her hand from her puppy has multiple puncture sites from puppy's teeth.  Patient reports that she did clean this after the incident occurred.  There is some redness surrounding these puncture sites.  Patient reports that she is overdue for a tetanus shot and is requesting this while she is here today.  Denies any fever, sensation change or loss in her hand or arm.            Allergies:  Allergies   Allergen Reactions    Nkda [No Known Drug Allergy]     Macrobid [Nitrofurantoin] Rash       Problem List:    Patient Active Problem List    Diagnosis Date Noted    Atypical chest pain 01/23/2014     Priority: Medium    Hyperlipidemia 08/16/2013     Priority: Medium     Diagnosis updated by automated process. Provider to review and confirm.      CARDIOVASCULAR SCREENING; LDL GOAL LESS THAN 160 10/31/2010     Priority: Medium    OSMANY 3 01/01/2009     Priority: Medium     10/21/08:Pap--LSIL  11/11/08:Colpo--OSMANY 2-3. Rec LEEP  12/8/08:LEEP--OSMANY 3, clear margins. Repeat pap in 6 months and pap and HPV in 12 months.  8/10/09:pap--NIL. Repeat pap 6 months.  9/21/10:Pap--NIL. Repeat pap 1 year.  9/15/11 pap ASCUS, - HPV. Rpt pap in one year. In HM, ep, prob list, hx updated. Reminder sent.  11/6/12:Pap--NIL. Pap in 1 year.  2/14/14:Pap--NIL, -HPV. History of abnormal Pap smear: YES - OSMANY 2/3 on biopsy - PAP/HPV co-testing at 12, 24 months.  If two negative results repeat co-testing in 3 years, if negative then routine screening. Pap + HPV in 1 year.            Back pain 05/13/2008     Priority: Medium     03/06/2008: Seen by [facility]  Assessment: Right renal  calculi, 3 mm unchanged in size and location. Low back pain, right-sided, likely musculoskeletal.      TOBACCO USE DISORDER 05/08/2008     Priority: Medium    Generalized anxiety disorder 04/03/2007     Priority: Medium     Failed Prozac - increased anxiety  Celexa - weight gain  Zoloft - head zaps  Paxil - side effects  9/10 trial Lexapro        Calculus of kidney      Priority: Medium     1/07 Cysto, let ureteroscopy, lithotripsy, retrograde pyelogram and stone extraction with double J stent insertion-Dr Skinner.  08/13/07  3-mm stone in the right kidney-that is being followed.Her stones are calcium oxalate..  03/06/2008: Seen by [facility]  Assessment: Right renal calculi, 3 mm unchanged in size and location. Low back pain, right-sided, likely musculoskeletal.        BREAST DISORDERS NEC - enlargement/assymetry 11/25/2006     Priority: Medium     Problem list name updated by automated process. Provider to review and confirm      Contraceptive management      Priority: Medium     Problem list name updated by automated process. Provider to review      Health Care Home 08/13/2013     Priority: Low     EMERGENCY CARE PLAN  August 13, 2013: No current Care Coordination follow up planned. Please refer if Care Coordination services are needed.    Presenting Problem Signs and Symptoms Treatment Plan   Questions or concerns   during clinic hours   I will call my clinic directly:  [address] [address]  [phone].    Questions or concerns outside clinic hours   I will call the 24 hour nurse line at   [phone] or [facility].   Need to schedule an appointment   I will call the 24 hour scheduling team at [phone] or my clinic directly at [phone].    Same day treatment     I will call my clinic first, nurse line if after hours, urgent care and express care if needed.     Clinic care coordination services (regular clinic hours)     I  will call a clinic care coordinator directly:     Facundo Mina RN  Sergio Perez Fri - [phone]  Wed, Thurs - [phone]    Tabitha Bri, :    [phone]    Or call my clinic at [phone] and ask to speak with care coordination.     Crisis Services: Behavioral or Mental Health  [facility] 24 Hour Phone Line  [phone]    [facility] 24 Hour Crisis Services  [phone]    [facility] (Behavioral Health Providers) Network [phone]    [facility]   [phone]         Emergency treatment -- Immediately    [facility] 911             Past Medical History:    Past Medical History:   Diagnosis Date    Chickenpox     OSMANY 3 12/08    Postpartum depression        Past Surgical History:    Past Surgical History:   Procedure Laterality Date    CYSTOSCOPY,URETEROSCOPY,LITHOTRIPSY  01/29/2007    LEFT flexible ureteroscopy, LEFT laser lithotripsy    HC UROGRAPHY, RETROGRADE W/WO KUB  01/29/2007    LEFT    HERNIA REPAIR      Right    LAPAROSCOPIC TUBAL LIGATION  10/25/2011    Procedure:LAPAROSCOPIC TUBAL LIGATION; Bilateral Laparoscopic Tubal Ligation with filshie clips; Surgeon:SHANE HENDERSON; Location:[place] OR    LEEP TX, CERVICAL  12/8/08    OSMANY 3 *yearly pap indicated*    Stone extraction and double J insertion performed at [facility] by Raffaele Skinner MD  01/29/2007       Family History:    Family History   Problem Relation Age of Onset    C.A.D. Father         CAB 3v, in 40's    Lipids Father     Lipids Mother     Gastrointestinal Disease Mother 59        colitis    Lipids Sister        Social History:  Marital Status:  Single [1]  Social History     Tobacco Use    Smoking status: Every Day     Current packs/day: 0.50     Average packs/day: 0.5 packs/day for 10.0 years (5.0 ttl pk-yrs)     Types: Cigarettes    Smokeless tobacco: Never   Substance Use Topics    Alcohol use: No    Drug use: No        Medications:    albuterol (PROAIR HFA, PROVENTIL HFA, VENTOLIN HFA) 108  (90 BASE) MCG/ACT inhaler  doxycycline (VIBRA-TABS) 100 MG tablet  EPINEPHrine (EPIPEN) 0.3 MG/0.3ML injection  fluconazole (DIFLUCAN) 200 MG tablet  ibuprofen (ADVIL,MOTRIN) 800 MG tablet  multivitamin, therapeutic with minerals (MULTI-VITAMIN) TABS  nitroFURantoin, macrocrystal-monohydrate, (MACROBID) 100 MG capsule  Omega-3 Fat Ac-Cholecalciferol 500-1000 MG-UNIT CAPS  Probiotic Product (PROBIOTIC PO)  Ranitidine HCl (ZANTAC PO)          Review of Systems  A medically appropriate review of systems was performed with pertinent positives and negatives noted in the HPI, and all other systems negative.    Physical Exam   No data found.       Physical Exam  General: alert and in no acute distress on arrival  Head: atraumatic, normocephalic  Lungs:  nonlabored  CV:  extremities warm and perfused, brisk capillary refill all extremities.  Skin: no rashes, no diaphoresis and skin color normal, several puncture wounds from teeth puppy bite, 5 puncture sites to the left hand.  These areas are mildly red no drainage present.  Neuro: Patient awake, alert, speech is fluent  Psychiatric: affect/mood normal, appropriate historian.      ED Course                 Procedures                    No results found for this or any previous visit (from the past 24 hour(s)).    MEDICATIONS GIVEN IN THE EMERGENCY DEPARTMENT:  Medications   Tdap (tetanus-diphtheria-acell pertussis) (ADACEL) injection 0.5 mL (0.5 mLs Intramuscular $Given 5/2/24 6156)                Assessments & Plan (with Medical Decision Making)  43 year old female who presents to the Urgent Care for evaluation of dog bite, initial encounter, the wound was cleaned and examined for signs and symptoms of infection.  Augmentin ordered twice daily for 7 days, encourage patient to finish all of oral antibiotics, return if symptoms of infection began such as increased pain, redness, pus colored drainage.  Tetanus immunization was given in urgent care today.       I have reviewed  the nursing notes.    I have reviewed the findings, diagnosis, plan and need for follow up with the patient.        NEW PRESCRIPTIONS STARTED AT TODAY'S ER VISIT  Discharge Medication List as of 5/2/2024  3:29 PM        START taking these medications    Details   amoxicillin-clavulanate (AUGMENTIN) 875-125 MG tablet Take 1 tablet by mouth 2 times daily for 7 days, Disp-14 tablet, R-0, E-Prescribe             Final diagnoses:   Dog bite, initial encounter       5/2/2024   [facility] EMERGENCY DEPT       Pascale Bernstein, JO CNP  05/13/24 7132

## 2024-12-29 ENCOUNTER — HOSPITAL ENCOUNTER (EMERGENCY)
Facility: CLINIC | Age: 44
Discharge: HOME | End: 2024-12-29
Attending: PHYSICIAN ASSISTANT | Admitting: PHYSICIAN ASSISTANT
Payer: COMMERCIAL

## 2024-12-29 VITALS
RESPIRATION RATE: 16 BRPM | DIASTOLIC BLOOD PRESSURE: 57 MMHG | HEART RATE: 75 BPM | TEMPERATURE: 97 F | OXYGEN SATURATION: 97 % | SYSTOLIC BLOOD PRESSURE: 132 MMHG

## 2024-12-29 DIAGNOSIS — N93.9 ABNORMAL VAGINAL BLEEDING: ICD-10-CM

## 2024-12-29 DIAGNOSIS — R39.9 LOWER URINARY TRACT SYMPTOMS: ICD-10-CM

## 2024-12-29 LAB
ALBUMIN UR-MCNC: NEGATIVE MG/DL
APPEARANCE UR: CLEAR
BILIRUB UR QL STRIP: NEGATIVE
CLUE CELLS: NORMAL
COLOR UR AUTO: ABNORMAL
GLUCOSE UR STRIP-MCNC: NEGATIVE MG/DL
HCG UR QL: NEGATIVE
HGB UR QL STRIP: ABNORMAL
KETONES UR STRIP-MCNC: NEGATIVE MG/DL
LEUKOCYTE ESTERASE UR QL STRIP: NEGATIVE
NITRATE UR QL: NEGATIVE
PH UR STRIP: 7 [PH] (ref 5–7)
RBC URINE: 1 /HPF
SP GR UR STRIP: 1 (ref 1–1.03)
SQUAMOUS EPITHELIAL: 1 /HPF
TRICHOMONAS, WET PREP: NORMAL
UROBILINOGEN UR STRIP-MCNC: NORMAL MG/DL
WBC URINE: <1 /HPF
WBC'S/HIGH POWER FIELD, WET PREP: NORMAL
YEAST, WET PREP: NORMAL

## 2024-12-29 PROCEDURE — 81025 URINE PREGNANCY TEST: CPT | Performed by: PHYSICIAN ASSISTANT

## 2024-12-29 PROCEDURE — 81001 URINALYSIS AUTO W/SCOPE: CPT | Performed by: PHYSICIAN ASSISTANT

## 2024-12-29 PROCEDURE — 87210 SMEAR WET MOUNT SALINE/INK: CPT | Performed by: PHYSICIAN ASSISTANT

## 2024-12-29 PROCEDURE — G0463 HOSPITAL OUTPT CLINIC VISIT: HCPCS | Performed by: PHYSICIAN ASSISTANT

## 2024-12-29 PROCEDURE — 99214 OFFICE O/P EST MOD 30 MIN: CPT | Performed by: PHYSICIAN ASSISTANT

## 2024-12-29 PROCEDURE — 87086 URINE CULTURE/COLONY COUNT: CPT | Performed by: PHYSICIAN ASSISTANT

## 2024-12-29 ASSESSMENT — ENCOUNTER SYMPTOMS
CONSTITUTIONAL NEGATIVE: 1
MUSCULOSKELETAL NEGATIVE: 1
DYSURIA: 1
FREQUENCY: 1

## 2024-12-29 ASSESSMENT — ACTIVITIES OF DAILY LIVING (ADL)
ADLS_ACUITY_SCORE: 41
ADLS_ACUITY_SCORE: 41

## 2024-12-29 NOTE — ED PROVIDER NOTES
History   No chief complaint on file.    HPI  Mari Mclaughlin is a 44 year old female who presents to Urgent Care with complaints of possible UTI.  Patient specifically complains of dysuria and increased urinary frequency.  She also reports intermittent external vaginal burning and vaginal discharge.  She has intermittent vaginal bleeding and states she gets her period about once every 3 weeks.  Patient states she has been dealing with chronic bacterial vaginosis and abnormal vaginal bleeding for years.  She is currently following with a holistic medicine group and being treated for hormonal imbalance.  Patient states she has not followed with a gynecologist.  Denies fevers, chills, nausea, vomiting, abdominal pain, back or flank pain, or hematuria.      Allergies:  Allergies   Allergen Reactions    Nkda [No Known Drug Allergy]     Macrobid [Nitrofurantoin] Rash       Problem List:    Patient Active Problem List    Diagnosis Date Noted    Atypical chest pain 01/23/2014     Priority: Medium    Hyperlipidemia 08/16/2013     Priority: Medium     Diagnosis updated by automated process. Provider to review and confirm.      CARDIOVASCULAR SCREENING; LDL GOAL LESS THAN 160 10/31/2010     Priority: Medium    OSMANY 3 01/01/2009     Priority: Medium     10/21/08:Pap--LSIL  11/11/08:Colpo--OSMANY 2-3. Rec LEEP  12/8/08:LEEP--OSMANY 3, clear margins. Repeat pap in 6 months and pap and HPV in 12 months.  8/10/09:pap--NIL. Repeat pap 6 months.  9/21/10:Pap--NIL. Repeat pap 1 year.  9/15/11 pap ASCUS, - HPV. Rpt pap in one year. In HM, ep, prob list, hx updated. Reminder sent.  11/6/12:Pap--NIL. Pap in 1 year.  2/14/14:Pap--NIL, -HPV. History of abnormal Pap smear: YES - OSMANY 2/3 on biopsy - PAP/HPV co-testing at 12, 24 months.  If two negative results repeat co-testing in 3 years, if negative then routine screening. Pap + HPV in 1 year.            Back pain 05/13/2008     Priority: Medium     03/06/2008: Seen by [facility]  Assessment: Right renal calculi, 3 mm unchanged in size and location. Low back pain, right-sided, likely musculoskeletal.      TOBACCO USE DISORDER 05/08/2008     Priority: Medium    Generalized anxiety disorder 04/03/2007     Priority: Medium     Failed Prozac - increased anxiety  Celexa - weight gain  Zoloft - head zaps  Paxil - side effects  9/10 trial Lexapro        Calculus of kidney      Priority: Medium     1/07 Cysto, let ureteroscopy, lithotripsy, retrograde pyelogram and stone extraction with double J stent insertion-Dr Skinner.  08/13/07  3-mm stone in the right kidney-that is being followed.Her stones are calcium oxalate..  03/06/2008: Seen by [facility]  Assessment: Right renal calculi, 3 mm unchanged in size and location. Low back pain, right-sided, likely musculoskeletal.        BREAST DISORDERS NEC - enlargement/assymetry 11/25/2006     Priority: Medium     Problem list name updated by automated process. Provider to review and confirm      Contraceptive management      Priority: Medium     Problem list name updated by automated process. Provider to review          Past Medical History:    Past Medical History:   Diagnosis Date    Chickenpox     OSMANY 3 12/08    Postpartum depression        Past Surgical History:    Past Surgical History:   Procedure Laterality Date    CYSTOSCOPY,URETEROSCOPY,LITHOTRIPSY  01/29/2007    LEFT flexible ureteroscopy, LEFT laser lithotripsy    HC UROGRAPHY, RETROGRADE W/WO KUB  01/29/2007    LEFT    HERNIA REPAIR      Right    LAPAROSCOPIC TUBAL LIGATION  10/25/2011    Procedure:LAPAROSCOPIC TUBAL LIGATION; Bilateral Laparoscopic Tubal Ligation with filshie clips; Surgeon:SHANE HENDERSON; Location:[place] OR    LEEP TX, CERVICAL  12/8/08    OSMANY 3 *yearly pap indicated*    Stone extraction and double J insertion performed at [facility] by Raffaele Skinner MD  01/29/2007       Family History:    Family History    Problem Relation Age of Onset    C.A.D. Father         CAB 3v, in 40's    Lipids Father     Lipids Mother     Gastrointestinal Disease Mother 59        colitis    Lipids Sister        Social History:  Marital Status:  Single [1]  Social History     Tobacco Use    Smoking status: Every Day     Current packs/day: 0.50     Average packs/day: 0.5 packs/day for 10.0 years (5.0 ttl pk-yrs)     Types: Cigarettes    Smokeless tobacco: Never   Substance Use Topics    Alcohol use: No    Drug use: No        Medications:    albuterol (PROAIR HFA, PROVENTIL HFA, VENTOLIN HFA) 108 (90 BASE) MCG/ACT inhaler  doxycycline (VIBRA-TABS) 100 MG tablet  EPINEPHrine (EPIPEN) 0.3 MG/0.3ML injection  fluconazole (DIFLUCAN) 200 MG tablet  ibuprofen (ADVIL,MOTRIN) 800 MG tablet  multivitamin, therapeutic with minerals (MULTI-VITAMIN) TABS  nitroFURantoin, macrocrystal-monohydrate, (MACROBID) 100 MG capsule  Omega-3 Fat Ac-Cholecalciferol 500-1000 MG-UNIT CAPS  Probiotic Product (PROBIOTIC PO)  Ranitidine HCl (ZANTAC PO)          Review of Systems   Constitutional: Negative.    Genitourinary:  Positive for dysuria, frequency, vaginal bleeding and vaginal discharge.   Musculoskeletal: Negative.    All other systems reviewed and are negative.      Physical Exam   BP: 132/57  Pulse: 75  Temp: 97  F (36.1  C)  Resp: 16  SpO2: 97 %      Physical Exam  Constitutional:       General: She is not in acute distress.     Appearance: Normal appearance. She is not ill-appearing, toxic-appearing or diaphoretic.   HENT:      Head: Normocephalic and atraumatic.   Pulmonary:      Effort: Pulmonary effort is normal.   Abdominal:      General: There is no distension.      Palpations: Abdomen is soft.      Tenderness: There is no abdominal tenderness. There is no guarding or rebound.   Genitourinary:     Labia:         Right: No rash, tenderness or lesion.         Left: No rash, tenderness or lesion.       Vagina: No vaginal discharge, erythema, tenderness,  bleeding or lesions.      Cervix: Normal.      Uterus: Not tender.       Adnexa:         Right: No tenderness.          Left: No tenderness.     Skin:     General: Skin is warm and dry.   Neurological:      Mental Status: She is alert.         ED Course        Procedures      Results for orders placed or performed during the hospital encounter of 12/29/24 (from the past 24 hours)   UA with Microscopic reflex to Culture    Specimen: Urine, Clean Catch   Result Value Ref Range    Color Urine Straw Colorless, Straw, Light Yellow, Yellow    Appearance Urine Clear Clear    Glucose Urine Negative Negative mg/dL    Bilirubin Urine Negative Negative    Ketones Urine Negative Negative mg/dL    Specific Gravity Urine 1.003 1.003 - 1.035    Blood Urine Moderate (A) Negative    pH Urine 7.0 5.0 - 7.0    Protein Albumin Urine Negative Negative mg/dL    Urobilinogen Urine Normal Normal, 2.0 mg/dL    Nitrite Urine Negative Negative    Leukocyte Esterase Urine Negative Negative    RBC Urine 1 <=2 /HPF    WBC Urine <1 <=5 /HPF    Squamous Epithelials Urine 1 <=1 /HPF    Narrative    Urine Culture not indicated   HCG qualitative urine (UPT)   Result Value Ref Range    hCG Urine Qualitative Negative Negative   Wet prep    Specimen: Vagina; Swab   Result Value Ref Range    Trichomonas Absent Absent    Yeast Absent Absent    Clue Cells Absent Absent    WBCs/high power field None None       Medications - No data to display    Assessments & Plan (with Medical Decision Making)     Pt is a 44 year old female who presents to Urgent Care with complaints of possible UTI.  Patient specifically complains of dysuria and increased urinary frequency.  She also reports intermittent external vaginal burning and vaginal discharge.  She has intermittent vaginal bleeding and states she gets her period about once every 3 weeks.  Patient states she has been dealing with chronic bacterial vaginosis and abnormal vaginal bleeding for years.  She is currently  following with a holistic medicine group and being treated for hormonal imbalance.      Pt is afebrile on arrival.  Exam as above.  Urinalysis was negative for infection urine was sent for culture.  Wet prep was negative for yeast, clue cells, or trichomonas.  Discussed results with patient.  Encouraged symptomatic treatments at home.  Recommended follow-up with gynecology given her abnormal vaginal bleeding for years.  Referral for this was placed.  Return precautions were reviewed.  Hand-outs were provided.    Patient was instructed to follow-up with PCP for continued care and management.  She is to return to the ED for persistent and/or worsening symptoms.  Patient expressed understanding of the diagnosis and plan and was discharged home in good condition.    I have reviewed the nursing notes.    I have reviewed the findings, diagnosis, plan and need for follow up with the patient.      Discharge Medication List as of 12/29/2024  3:05 PM          Final diagnoses:   Lower urinary tract symptoms   Abnormal vaginal bleeding       12/29/2024   [facility] EMERGENCY DEPT      Disclaimer:  This note consists of symbols derived from keyboarding, dictation and/or voice recognition software.  As a result, there may be errors in the script that have gone undetected.  Please consider this when interpreting information found in this chart.     Corie Truong PA-C  12/29/24 5539

## 2024-12-30 LAB — BACTERIA UR CULT: NORMAL

## 2025-04-12 ENCOUNTER — APPOINTMENT (OUTPATIENT)
Dept: GENERAL RADIOLOGY | Facility: CLINIC | Age: 45
End: 2025-04-12
Attending: NURSE PRACTITIONER
Payer: COMMERCIAL

## 2025-04-12 ENCOUNTER — HOSPITAL ENCOUNTER (EMERGENCY)
Facility: CLINIC | Age: 45
Discharge: HOME | End: 2025-04-12
Attending: NURSE PRACTITIONER | Admitting: NURSE PRACTITIONER
Payer: COMMERCIAL

## 2025-04-12 VITALS
SYSTOLIC BLOOD PRESSURE: 137 MMHG | RESPIRATION RATE: 18 BRPM | TEMPERATURE: 97.6 F | HEART RATE: 75 BPM | OXYGEN SATURATION: 98 % | DIASTOLIC BLOOD PRESSURE: 81 MMHG

## 2025-04-12 DIAGNOSIS — J20.9 ACUTE BRONCHITIS WITH SYMPTOMS > 10 DAYS: ICD-10-CM

## 2025-04-12 PROCEDURE — 71046 X-RAY EXAM CHEST 2 VIEWS: CPT

## 2025-04-12 PROCEDURE — G0463 HOSPITAL OUTPT CLINIC VISIT: HCPCS | Mod: 25

## 2025-04-12 PROCEDURE — 99213 OFFICE O/P EST LOW 20 MIN: CPT | Performed by: NURSE PRACTITIONER

## 2025-04-12 RX ORDER — AZITHROMYCIN 250 MG/1
TABLET, FILM COATED ORAL
Qty: 6 TABLET | Refills: 0 | Status: SHIPPED | OUTPATIENT
Start: 2025-04-12 | End: 2025-04-17

## 2025-04-12 RX ORDER — PREDNISONE 20 MG/1
20 TABLET ORAL DAILY
Qty: 3 TABLET | Refills: 0 | Status: SHIPPED | OUTPATIENT
Start: 2025-04-12 | End: 2025-04-15

## 2025-04-12 ASSESSMENT — COLUMBIA-SUICIDE SEVERITY RATING SCALE - C-SSRS
1. IN THE PAST MONTH, HAVE YOU WISHED YOU WERE DEAD OR WISHED YOU COULD GO TO SLEEP AND NOT WAKE UP?: NO
2. HAVE YOU ACTUALLY HAD ANY THOUGHTS OF KILLING YOURSELF IN THE PAST MONTH?: NO
6. HAVE YOU EVER DONE ANYTHING, STARTED TO DO ANYTHING, OR PREPARED TO DO ANYTHING TO END YOUR LIFE?: NO

## 2025-04-12 ASSESSMENT — ACTIVITIES OF DAILY LIVING (ADL)
ADLS_ACUITY_SCORE: 41
ADLS_ACUITY_SCORE: 41

## 2025-04-12 NOTE — ED PROVIDER NOTES
Chief Complaint:   Chief Complaint   Patient presents with    Cough         HPI:   Mari Mclaughlin is a 44 year old female who presents to the UC/ED with a 9 day history of congestion, ear pressure, productive cough, and achiness after having a viral illness. She denies diarrhea, nausea, and vomiting.  She has been exposed to ill contacts.  Has a previous history of asthma/reactive airway with viral illnesses in the past.  She has been using her albuterol inhaler once to twice daily with some improvement of her symptoms.  Medical history/ Predisposing factors include:of: History of reactive airway with viral illnesses, obesity, hyperlipidemia, back pain and previous tobacco user.      Problem List:    Patient Active Problem List    Diagnosis Date Noted    Atypical chest pain 01/23/2014     Priority: Medium    Hyperlipidemia 08/16/2013     Priority: Medium     Diagnosis updated by automated process. Provider to review and confirm.      CARDIOVASCULAR SCREENING; LDL GOAL LESS THAN 160 10/31/2010     Priority: Medium    OSMANY 3 01/01/2009     Priority: Medium     10/21/08:Pap--LSIL  11/11/08:Colpo--OSMANY 2-3. Rec LEEP  12/8/08:LEEP--OSMANY 3, clear margins. Repeat pap in 6 months and pap and HPV in 12 months.  8/10/09:pap--NIL. Repeat pap 6 months.  9/21/10:Pap--NIL. Repeat pap 1 year.  9/15/11 pap ASCUS, - HPV. Rpt pap in one year. In , ep, prob list, hx updated. Reminder sent.  11/6/12:Pap--NIL. Pap in 1 year.  2/14/14:Pap--NIL, -HPV. History of abnormal Pap smear: YES - OSMANY 2/3 on biopsy - PAP/HPV co-testing at 12, 24 months.  If two negative results repeat co-testing in 3 years, if negative then routine screening. Pap + HPV in 1 year.            Back pain 05/13/2008     Priority: Medium     03/06/2008: Seen by [facility]  Assessment: Right renal calculi, 3 mm unchanged in size and location. Low back pain, right-sided, likely musculoskeletal.      TOBACCO USE DISORDER 05/08/2008     Priority:  Medium    Generalized anxiety disorder 04/03/2007     Priority: Medium     Failed Prozac - increased anxiety  Celexa - weight gain  Zoloft - head zaps  Paxil - side effects  9/10 trial Lexapro        Calculus of kidney      Priority: Medium     1/07 Cysto, let ureteroscopy, lithotripsy, retrograde pyelogram and stone extraction with double J stent insertion-Dr Skinner.  08/13/07  3-mm stone in the right kidney-that is being followed.Her stones are calcium oxalate..  03/06/2008: Seen by [facility]  Assessment: Right renal calculi, 3 mm unchanged in size and location. Low back pain, right-sided, likely musculoskeletal.        BREAST DISORDERS NEC - enlargement/assymetry 11/25/2006     Priority: Medium     Problem list name updated by automated process. Provider to review and confirm      Contraceptive management      Priority: Medium     Problem list name updated by automated process. Provider to review          Past Medical History:    Past Medical History:   Diagnosis Date    Chickenpox     OSMANY 3 12/08    Postpartum depression        Past Surgical History:    Past Surgical History:   Procedure Laterality Date    CYSTOSCOPY,URETEROSCOPY,LITHOTRIPSY  01/29/2007    LEFT flexible ureteroscopy, LEFT laser lithotripsy    HC UROGRAPHY, RETROGRADE W/WO KUB  01/29/2007    LEFT    HERNIA REPAIR      Right    LAPAROSCOPIC TUBAL LIGATION  10/25/2011    Procedure:LAPAROSCOPIC TUBAL LIGATION; Bilateral Laparoscopic Tubal Ligation with filshie clips; Surgeon:SHANE HENDERSON; Location:[facility] OR    LEEP TX, CERVICAL  12/8/08    OSMANY 3 *yearly pap indicated*    Stone extraction and double J insertion performed at [facility] by Raffaele Skinner MD  01/29/2007         Meds:   Current Outpatient Medications   Medication Sig Dispense Refill    albuterol (PROAIR HFA, PROVENTIL HFA, VENTOLIN HFA) 108 (90 BASE) MCG/ACT inhaler Inhale 2 puffs into the lungs every 6 hours as needed for  shortness of breath / dyspnea 1 Inhaler 0    doxycycline (VIBRA-TABS) 100 MG tablet Take 1 tablet (100 mg) by mouth 2 times daily 56 tablet 1    EPINEPHrine (EPIPEN) 0.3 MG/0.3ML injection Inject 0.3 mLs (0.3 mg) into the muscle once as needed 2 each 3    fluconazole (DIFLUCAN) 200 MG tablet Take 1 tablet (200 mg) by mouth daily Take 1 tablet after finishing antibiotics 1 tablet 0    ibuprofen (ADVIL,MOTRIN) 800 MG tablet Take 1 tablet (800 mg) by mouth every 8 hours as needed for pain 90 tablet 1    multivitamin, therapeutic with minerals (MULTI-VITAMIN) TABS Take 1 tablet by mouth daily      nitroFURantoin, macrocrystal-monohydrate, (MACROBID) 100 MG capsule Take 1 capsule (100 mg) by mouth 2 times daily 14 capsule 0    Omega-3 Fat Ac-Cholecalciferol 500-1000 MG-UNIT CAPS       Probiotic Product (PROBIOTIC PO) Take 1 capsule by mouth every evening       Ranitidine HCl (ZANTAC PO) Take 150 mg by mouth 2 times daily         Allergies:   Allergies   Allergen Reactions    Nkda [No Known Drug Allergy]     Macrobid [Nitrofurantoin] Rash       Medications updated and reviewed.  Past, family and surgical history is updated and reviewed in the record.     Review of Systems:  General: see HPI  HEENT: see HPI  Respiratory: see HPI    Physical Exam:   /81   Pulse 75   Temp 97.6  F (36.4  C) (Tympanic)   Resp 18   SpO2 98%      General: No acute distress on arrival  Head: normocephalic, non-traumatic.  Eyes: Non-reddened conjunctiva, no icterus, noninjected, normal pupillary response to light accommodation bilaterally.  Ears: Left ear: TM intact, middle ear non-erythemic, no purulence, canal non-erythemic, patent. Right ear: TM intact, middle ear non-erythemic without purulence, canal non-erythremic, patent.  Nose: Non-erythemic, no purulence present no edema, patent nostrils.  Mouth/Throat: No oral lesions, moist mucous membranes, Non-erythemic, midline uvula non enlarged tonsils or exudates present.  No cervical  adenopathy present..  CV: Regular rate and rhythm, no cyanosis.  Respiratory: Nonlabored, scattered rhonchi throughout difficult to hear bases bilateral, prolonged expiratory phase and wheezing present.  Abdomen: NT, ND, normal bowel sounds present  Skin: No rashes, lesions, normal color.  Neuro: Normal, active, age-appropriate. Normal response to verbal stimuli. No obvious focal deficits.      Medical Decision Making:  Upper respiratory infection symptoms with Normal vitals.  CXR is indicated.  Rapid Strep test is not indicated. COVID/RSV/Influenza A and B testing is not    Results for orders placed or performed during the hospital encounter of 04/12/25 (from the past 48 hours)   Chest XR,  PA & LAT    Narrative    EXAM: XR CHEST 2 VIEWS  LOCATION: [facility]  DATE: 04/12/2025    INDICATION: Scattered rhonchi in upper lobes, diminished bases bilateral.  COMPARISON: 08/14/2018      Impression    IMPRESSION: Heart and mediastinal size are normal. No acute airspace opacities. No pleural effusion or pneumothorax. There is indistinctness of the pulmonary interstitium involving the bilateral lower lobes, likely reflecting airway inflammation.         Assessment:  Acute bronchitis with history of reactive airway    Differentials: Pneumonia, ordered a chest x-ray personally viewed images radiology interpretation reviewed.  No opacities seen on x-rays or pneumonias.  Due to decreased airflow through lower lobes bilateral we will treat empirically for pneumonia with azithromycin and Augmentin.    Plan:   Tylenol, Ibuprofen, Fluids, and Rx bronchitis   Augmentin twice daily for 5 days and azithromycin daily for 5 days-will treat empirically for pneumonia recommend follow-up next week in primary clinic if symptoms are not improving or if symptoms worsen despite recommended treatment plan she should return for further evaluation in the emergency department.  A 3-day course of prednisone was ordered  to decrease inflammation in the lungs.      Reassurance given regarding diagnosis and recommendations.  Discussed home treatment with antipyretics and Augmentin and azithromycin  Prescriptions .  Recommend follow up in primary care as needed, or sooner if symptoms persist. Return to the ED with fever, trouble swallowing or breathing, or any other concerns.         MEDICATIONS GIVEN IN THE EMERGENCY DEPARTMENT:  Medications - No data to display      I have reviewed the nursing notes.  I have reviewed the findings, diagnosis, plan and need for follow up with the patient.        NEW PRESCRIPTIONS STARTED AT TODAY'S ER VISIT  Discharge Medication List as of 4/12/2025  2:23 PM        START taking these medications    Details   amoxicillin-clavulanate (AUGMENTIN) 875-125 MG tablet Take 1 tablet by mouth 2 times daily for 5 days., Disp-10 tablet, R-0, E-Prescribe      azithromycin (ZITHROMAX) 250 MG tablet Take 2 tablets (500 mg) by mouth daily for 1 day, THEN 1 tablet (250 mg) daily for 4 days., Disp-6 tablet, R-0, E-Prescribe             Final diagnoses:   Acute bronchitis with symptoms > 10 days       4/12/2025   [facility] EMERGENCY DEPT  Condition on disposition: Stable    Recommended supportive cares to aid with symptoms including cough, shortness of breath with history of reactive airway with viral illnesses.  Symptoms for 9 days after upper respiratory viral illness symptoms for 7.. Advised that if symptoms are not improving despite this treatment plan, then they should follow-up with primary provider for reevaluation. Strict UC/ED return precautions discussed in detail including prolonged fevers, development of shortness of breath or trouble with breathing, weakness or lightheadedness, inability to tolerate oral intake or anything else that is concerning to them. All questions were answered. Pt verbalized understanding and agreement with the above plan.  Advised to continue using albuterol inhalers  as needed.    Patient verbalized agreement with and understanding of the rational for the diagnosis and treatment plan.  All questions were answered to best of my ability and the patient's satisfaction. The patient was advised to contact or return to their primary clinic or UC/ED with any questions that may arise after this visit.      Disclaimer: This note consists of symbols derived from keyboarding, dictation, and/or voice recognition software. As a result, there may be errors in the script that have gone undetected.  Please consider this when interpreting information found in the chart.        Pascale Bernstein, JO CNP  04/12/25 [id number]

## 2025-04-12 NOTE — DISCHARGE INSTRUCTIONS
Will treat you for an acute bronchitis however the antibiotics were using would also treat pneumonia azithromycin is ordered for you for 5 days and Augmentin ordered twice daily for 5 days.  Recommend follow-up in 1 week in your primary care clinic if your symptoms worsen despite recommended treatment plan you should return for further evaluation and treatment.  A burst of steroids for 3 days is ordered to decrease inflammation inside your lungs.

## 2025-04-12 NOTE — ED TRIAGE NOTES
Pt reports cold -like symptoms x9 days   Chest wall pain , cough , congestion, shortness of breath   pt reports current smoker